# Patient Record
Sex: FEMALE | Race: WHITE | NOT HISPANIC OR LATINO | Employment: FULL TIME | URBAN - METROPOLITAN AREA
[De-identification: names, ages, dates, MRNs, and addresses within clinical notes are randomized per-mention and may not be internally consistent; named-entity substitution may affect disease eponyms.]

---

## 2017-11-30 ENCOUNTER — APPOINTMENT (OUTPATIENT)
Dept: WOMENS IMAGING | Facility: HOSPITAL | Age: 47
End: 2017-11-30

## 2018-12-28 ENCOUNTER — APPOINTMENT (OUTPATIENT)
Dept: WOMENS IMAGING | Facility: HOSPITAL | Age: 48
End: 2018-12-28

## 2018-12-28 PROCEDURE — 77063 BREAST TOMOSYNTHESIS BI: CPT | Performed by: RADIOLOGY

## 2018-12-28 PROCEDURE — 77067 SCR MAMMO BI INCL CAD: CPT | Performed by: RADIOLOGY

## 2020-01-07 ENCOUNTER — APPOINTMENT (OUTPATIENT)
Dept: WOMENS IMAGING | Facility: HOSPITAL | Age: 50
End: 2020-01-07

## 2020-01-07 PROCEDURE — 77063 BREAST TOMOSYNTHESIS BI: CPT | Performed by: RADIOLOGY

## 2020-01-07 PROCEDURE — 77067 SCR MAMMO BI INCL CAD: CPT | Performed by: RADIOLOGY

## 2020-01-28 ENCOUNTER — APPOINTMENT (OUTPATIENT)
Dept: WOMENS IMAGING | Facility: HOSPITAL | Age: 50
End: 2020-01-28

## 2020-01-28 PROCEDURE — 76641 ULTRASOUND BREAST COMPLETE: CPT | Performed by: RADIOLOGY

## 2020-01-28 PROCEDURE — 77065 DX MAMMO INCL CAD UNI: CPT | Performed by: RADIOLOGY

## 2020-01-28 PROCEDURE — 77061 BREAST TOMOSYNTHESIS UNI: CPT | Performed by: RADIOLOGY

## 2020-01-28 PROCEDURE — MDREVSPNEW: Performed by: RADIOLOGY

## 2020-01-28 PROCEDURE — G0279 TOMOSYNTHESIS, MAMMO: HCPCS | Performed by: RADIOLOGY

## 2020-01-31 ENCOUNTER — APPOINTMENT (OUTPATIENT)
Dept: WOMENS IMAGING | Facility: HOSPITAL | Age: 50
End: 2020-01-31

## 2020-01-31 PROCEDURE — 19084 BX BREAST ADD LESION US IMAG: CPT | Performed by: RADIOLOGY

## 2020-01-31 PROCEDURE — 19083 BX BREAST 1ST LESION US IMAG: CPT | Performed by: RADIOLOGY

## 2021-02-16 ENCOUNTER — APPOINTMENT (OUTPATIENT)
Dept: WOMENS IMAGING | Facility: HOSPITAL | Age: 51
End: 2021-02-16

## 2021-02-16 PROCEDURE — 77067 SCR MAMMO BI INCL CAD: CPT | Performed by: RADIOLOGY

## 2021-02-16 PROCEDURE — 77063 BREAST TOMOSYNTHESIS BI: CPT | Performed by: RADIOLOGY

## 2022-05-17 ENCOUNTER — APPOINTMENT (OUTPATIENT)
Dept: WOMENS IMAGING | Facility: HOSPITAL | Age: 52
End: 2022-05-17

## 2022-05-17 PROCEDURE — 77063 BREAST TOMOSYNTHESIS BI: CPT | Performed by: RADIOLOGY

## 2022-05-17 PROCEDURE — 77067 SCR MAMMO BI INCL CAD: CPT | Performed by: RADIOLOGY

## 2023-12-17 ENCOUNTER — TELEPHONE (OUTPATIENT)
Dept: URGENT CARE | Facility: CLINIC | Age: 53
End: 2023-12-17
Payer: COMMERCIAL

## 2023-12-17 DIAGNOSIS — J01.01 ACUTE RECURRENT MAXILLARY SINUSITIS: Primary | ICD-10-CM

## 2023-12-17 RX ORDER — FLUCONAZOLE 150 MG/1
150 TABLET ORAL ONCE
Qty: 1 TABLET | Refills: 0 | Status: SHIPPED | OUTPATIENT
Start: 2023-12-17 | End: 2023-12-17

## 2025-04-02 ENCOUNTER — TRANSCRIBE ORDERS (OUTPATIENT)
Dept: PHYSICAL THERAPY | Facility: HOSPITAL | Age: 55
End: 2025-04-02
Payer: COMMERCIAL

## 2025-04-02 DIAGNOSIS — M54.30 SCIATICA, UNSPECIFIED LATERALITY: Primary | ICD-10-CM

## 2025-04-08 ENCOUNTER — HOSPITAL ENCOUNTER (OUTPATIENT)
Dept: PHYSICAL THERAPY | Facility: HOSPITAL | Age: 55
Setting detail: THERAPIES SERIES
Discharge: HOME | End: 2025-04-08
Payer: COMMERCIAL

## 2025-04-08 DIAGNOSIS — M25.552 PAIN OF LEFT HIP: Primary | ICD-10-CM

## 2025-04-08 DIAGNOSIS — M54.42 MIDLINE LOW BACK PAIN WITH LEFT-SIDED SCIATICA, UNSPECIFIED CHRONICITY: ICD-10-CM

## 2025-04-08 PROCEDURE — 97161 PT EVAL LOW COMPLEX 20 MIN: CPT

## 2025-04-08 PROCEDURE — 97110 THERAPEUTIC EXERCISES: CPT

## 2025-04-09 NOTE — THERAPY EVALUATION
Outpatient Physical Therapy Ortho Initial Evaluation  [facility]     Patient Name: Kymberly Arzola  : 1970  MRN: [id number]  Today's Date: 2025      Visit Date: 2025    There is no problem list on file for this patient.       No past medical history on file.     Past Surgical History:   Procedure Laterality Date    HYSTERECTOMY         Visit Dx:     ICD-10-CM ICD-9-CM   1. Pain of left hip  M25.552 719.45   2. Midline low back pain with left-sided sciatica, unspecified chronicity  M54.42 724.3          Patient History       Row Name 25 1800 25 1400          History    Chief Complaint Difficulty with daily activities;Pain  -MO --     Type of Pain Back pain;Hip pain  -MO --     Brief Description of Current Complaint 53 y/o female pt reports to PT w/o ongoing low back pain and L sciatica. Full hx includes an old back injury that reinjured it in , had immediate relief with chiropractic care. In , she did back handspring on [employer], had small pulled / torn hamstring/ glute, healed in several weeks. Last year, she was training for the mini and had started walking with dumbbells and felt the immediate flare up. Was unable to run the mini, has had ongoing consistent pain since then. Has not gotten any relief with any interventions since then. Has gone on anti-inflammatory diet, lost weight however had no relief, tried chiropractor and stretches. Takes muscle relaxors intermittently, has tried cryotherapy Very aggravated with prolonged sitting. Does Use back brace intermittently. Typically walks 3 miles per day, works at home on computer however has standing desk that is more comfortable. Pain is very mild in the low back, greatest irritation is sciatic pain down the L side. Radicular symptom stops mid thigh, never down to knee or past. Does have intermittent numbness down into the entire leg. Denies any N/T into saddle area or B&B changes. Sleeping effected some, has started to  have some pain with side sleeping on her hips. Only alleviating factors are laying supine with pillow under knees and standing after being seated for a long time.  -MO --  -MO        Pain     Pain Location Back;Hip  -MO --     Pain at Present 8  -MO --     Pain at Best 2  -MO --     Pain at Worst 9  -MO --        Fall Risk Assessment    Any falls in the past year: No  -MO --        Services    Prior Rehab/Home Health Experiences No  -MO --        Daily Activities    Primary Language English  -MO --     Are you able to read Yes  -MO --     Are you able to write Yes  -MO --     How does patient learn best? Listening;Reading;Demonstration  -MO --     Does patient have problems with the following? None  -MO --     Barriers to learning None  -MO --     Pt Participated in POC and Goals Yes  -MO --        Safety    Are you being hurt, hit, or frightened by anyone at home or in your life? No  -MO --     Are you being neglected by a caregiver No  -MO --     Have you had any of the following issues with N/A  -MO --               User Key  (r) = Recorded By, (t) = Taken By, (c) = Cosigned By      Initials Name Provider Type    Rashida Adames, PT Physical Therapist                     PT Ortho       Row Name 04/08/25 1400       Quarter Clearing    Quarter Clearing Lower Quarter Clearing  -MO       Neural Tension Signs- Lower Quarter Clearing    SLR Left:;Positive  -MO       Sensory Screen for Light Touch- Lower Quarter Clearing    L1 (inguinal area) Intact  -MO    L2 (anterior mid thigh) Intact  -MO    L3 (distal anterior thigh) Intact  -MO    L4 (medial lower leg/foot) Intact  -MO    L5 (lateral lower leg/great toe) Intact  -MO    S1 (bottom of foot) Intact  -MO       Myotomal Screen- Lower Quarter Clearing    Hip flexion (L2) Bilateral:;4+ (Good +)  -MO    Knee extension (L3) Bilateral:;4+ (Good +)  -MO    Ankle DF (L4) Bilateral:;4+ (Good +)  -MO    Ankle PF (S1) Bilateral:;4+ (Good +)  -MO    Knee flexion (S2)  Bilateral:;4+ (Good +)  -MO       Lumbar ROM Screen- Lower Quarter Clearing    Lumbar Flexion Impaired  to feet however minimal flexion of lumbar spine noted, compensation through thoracic and hips  -MO    Lumbar Extension Normal  -MO    Lumbar Lateral Flexion Normal  -MO    Lumbar Rotation Normal  -MO       SI/Hip Screen- Lower Quarter Clearing    ASIS compression Bilateral:;Negative  -MO    ASIS distraction Bilateral:;Negative  -MO       Special Tests/Palpation    Special Tests/Palpation Lumbar/SI  -MO       Lumbosacral Accessory Motions    Lumbosacral Accessory Motions Tested? Yes  -MO       Lumbosacral Palpation    Lumbosacral Palpation? Yes  -MO    SI Bilateral:;Tender  -MO    Piriformis Bilateral:;Guarded/taut;Tender  -MO    Gluteus Torey Left:;Guarded/taut;Tender  -MO    Greater Tuberosity Bilateral:;Tender  -MO       MMT (Manual Muscle Testing)    Rt Lower Ext Rt Hip ABduction;Rt Hip Extension  -MO    Lt Lower Ext Lt Hip ABduction;Lt Hip Extension  -MO       MMT Right Lower Ext    Rt Hip Extension MMT, Gross Movement (4/5) good  -MO    Rt Hip ABduction MMT, Gross Movement (4/5) good  -MO       MMT Left Lower Ext    Lt Hip Extension MMT, Gross Movement (4/5) good  -MO    Lt Hip ABduction MMT, Gross Movement (4/5) good  -MO              User Key  (r) = Recorded By, (t) = Taken By, (c) = Cosigned By      Initials Name Provider Type    Rashida Adames, PT Physical Therapist                                Therapy Education  Education Details: Educated on PT role and POC; discussed expectations/timeframe for healing. Discussed modifying activity at this time, holding on daily 3 mile walks and completing only 3x per week at this time to ensure appropriate rest, did discuss attempting RCB or unweighted cardio activity vs walking. She is agreeable  Given: HEP, Symptoms/condition management  Program: New  How Provided: Verbal, Demonstration, Written  Provided to: Patient  Level of Understanding: Teach back education  performed, Verbalized, Demonstrated      PT OP Goals       Row Name 04/08/25 1800          PT Short Term Goals    STG Date to Achieve 05/08/25  -MO     STG 1 Pt. will be independent with initial HEP to improve self-management of condition.  -MO     STG 1 Progress New  -MO     STG 2 Pt. will demonstrate proper body mechanics with forward bending/lifting activities to preserve lumbar spine with functional activities.  -MO     STG 2 Progress New  -MO     STG 3 Pt will improve sitting tolerance to >30 mins prior to onset of pain for improved tolerance to car rides.  -MO     STG 3 Progress New  -MO        Long Term Goals    LTG Date to Achieve 06/07/25  -MO     LTG 1 Pt. will be independent with advanced HEP to improve long term independence with self management of condition.  -MO     LTG 1 Progress New  -MO     LTG 2 Pt. will score </= 15/50 on Modified Oswestry to indicate improved perception of disability.  -MO     LTG 2 Progress New  -MO     LTG 3 Pt will demo improved gross BL hip strength to >/= 4+/5.  -MO     LTG 3 Progress New  -MO     LTG 4 Pt will report centralization of L radicular symptoms, denying any neural symptoms past gluteal fold for long term management of condition.  -MO     LTG 4 Progress New  -MO     LTG 5 Pt will report ability to return to daily walking program with pain </= 1/10 for return to all recreational activities.  -MO     LTG 5 Progress New  -MO        Time Calculation    PT Goal Re-Cert Due Date 07/07/25  -MO               User Key  (r) = Recorded By, (t) = Taken By, (c) = Cosigned By      Initials Name Provider Type    Rashida Adames, PT Physical Therapist                     PT Assessment/Plan       Row Name 04/08/25 1800          PT Assessment    Functional Limitations Performance in self-care ADL;Performance in work activities;Performance in sport activities;Performance in leisure activities;Limitations in community activities  -MO     Impairments Gait;Pain;Range of motion;Muscle  strength;Impaired flexibility  -MO     Assessment Comments Kymberly Arzola is a 54 y.o. female referred to physical therapy for low back pain with L side sciatica with radicular symptoms. She presents with a stable clinical presentation, along with no remarkable comorbidities and personal factors of highly active lifestyle that may impact her progress in the plan of care. Pt presents today with moderate pain irritability as pt elects to stand for duration of eval secondary to impacted sitting tolerance. She has very mild strength deficits however has notable tightness throughout BL hip external rotators and hip flexors, as well as is TTP with taught bands present into BL glute tissue. She is (+) for slump and SLR on L side, (-) on R. (-) for all hip pathology workup. Her signs and symptoms are consistent with referring diagnosis. The previous impairments limit her ability to complete all daily ADLs without pain, navigate full workout without significant discomfort, and participate in all recreational activities. Pt will benefit from skilled PT to address the previous impairments and return to PLOF.  -MO     Please refer to paper survey for additional self-reported information No  -MO     Rehab Potential Good  -MO     Patient/caregiver participated in establishment of treatment plan and goals Yes  -MO     Patient would benefit from skilled therapy intervention Yes  -MO        PT Plan    PT Frequency 1x/week  -MO     Predicted Duration of Therapy Intervention (PT) 10 visits  -MO     Planned CPT's? PT EVAL LOW COMPLEXITY: 54165;PT RE-EVAL: 00737;PT THER PROC EA 15 MIN: 94457;PT THER ACT EA 15 MIN: 05236;PT MANUAL THERAPY EA 15 MIN: 55337;PT NEUROMUSC RE-EDUCATION EA 15 MIN: 82434;PT GAIT TRAINING EA 15 MIN: 21525;PT SELF CARE/HOME MGMT/TRAIN EA 15: 65691  -MO     Physical Therapy Interventions (Optional Details) dry needling  -MO     PT Plan Comments nustep warmup if tolerable position. Emphasize stretching initially  with potential manual. standing HS stretch, seated SB roll out, increase hold duration on seated figure 4 stretch, supine 90/90 glide, pigeon pose stretch(for ER), frog pose, cat/cow into marla pose. Could initiate manual to L glute tissue. figure 4 bridge, s/l hip abd, lateral walk, monster walk. May benefit from dry needling in the future  -MO               User Key  (r) = Recorded By, (t) = Taken By, (c) = Cosigned By      Initials Name Provider Type    Rashida Adames, PT Physical Therapist                       OP Exercises       Row Name 04/08/25 1800             Total Minutes    73121 - PT Therapeutic Exercise Minutes 8  -MO         Exercise 2    Exercise Name 2 supine 90/90 sciatic nerve glide  -MO      Cueing 2 Verbal;Demo  -MO      Sets 2 1B  -MO      Reps 2 10  -MO         Exercise 3    Exercise Name 3 supine/ seated piriformis stretch  -MO      Sets 3 1e, L  -MO      Reps 3 20s  -MO                User Key  (r) = Recorded By, (t) = Taken By, (c) = Cosigned By      Initials Name Provider Type    Rashida Adames, PT Physical Therapist                                  Outcome Measure Options: Modified Oswestry  Modified Oswestry  Modified Oswestry Score/Comments: 29/50      Time Calculation:     Start Time: 1410  Stop Time: 1448  Time Calculation (min): 38 min  Timed Charges  04145 - PT Therapeutic Exercise Minutes: 8  Untimed Charges  PT Eval/Re-eval Minutes: 30  Total Minutes  Timed Charges Total Minutes: 8  Untimed Charges Total Minutes: 30   Total Minutes: 38     Therapy Charges for Today       Code Description Service Date Service Provider Modifiers Qty    93994132226 HC PT THER PROC EA 15 MIN 4/8/2025 Rashida Amaya, PT GP 1    24280985705 HC PT EVAL LOW COMPLEXITY 2 4/8/2025 Rashida Amaya, PT GP 1            PT G-Codes  Outcome Measure Options: Modified Oswestry  Modified Oswestry Score/Comments: 29/50         Rashida Amaya PT  4/9/2025

## 2025-04-16 ENCOUNTER — HOSPITAL ENCOUNTER (OUTPATIENT)
Dept: PHYSICAL THERAPY | Facility: HOSPITAL | Age: 55
Setting detail: THERAPIES SERIES
Discharge: HOME | End: 2025-04-16
Payer: COMMERCIAL

## 2025-04-16 DIAGNOSIS — M25.552 PAIN OF LEFT HIP: Primary | ICD-10-CM

## 2025-04-16 DIAGNOSIS — M54.42 MIDLINE LOW BACK PAIN WITH LEFT-SIDED SCIATICA, UNSPECIFIED CHRONICITY: ICD-10-CM

## 2025-04-16 PROCEDURE — 97140 MANUAL THERAPY 1/> REGIONS: CPT | Performed by: PHYSICAL THERAPIST

## 2025-04-16 PROCEDURE — 97110 THERAPEUTIC EXERCISES: CPT | Performed by: PHYSICAL THERAPIST

## 2025-04-16 NOTE — THERAPY TREATMENT NOTE
Outpatient Physical Therapy Ortho Treatment Note  [facility]     Patient Name: Kymberly Arzola  : 1970  MRN: [id number]  Today's Date: 2025      Visit Date: 2025    Visit Dx:    ICD-10-CM ICD-9-CM   1. Pain of left hip  M25.552 719.45   2. Midline low back pain with left-sided sciatica, unspecified chronicity  M54.42 724.3       There is no problem list on file for this patient.       No past medical history on file.     Past Surgical History:   Procedure Laterality Date    HYSTERECTOMY          PT Ortho       Row Name 25 1400       Neural Tension Signs- Lower Quarter Clearing    SLR Left:;Negative  performed after manual techniques  -GJ       Myotomal Screen- Lower Quarter Clearing    Ankle DF (L4) Left:;5 (Normal)  -              User Key  (r) = Recorded By, (t) = Taken By, (c) = Cosigned By      Initials Name Provider Type    Raghav Martinez, PT Physical Therapist                                 PT Assessment/Plan       Row Name 25 4173          PT Assessment    Assessment Comments Ms. Arzola returns to the clinic for her first follow up PT session for her LBP/RLE pain. She reports minimal change in her condition. She reports pain along the L posterior/lateral thigh to the knee, not below. Aggravating activities include sitting and sit to stand transition. She demosntrates (+) L posterior/lateral hip girdle taut bands/trigger points.  Performes deep tissue massage/trigger point release to L posterior/lateral hip girdle tissue. Educated in self trigger point release with teniss ball at wall. We also reviewed risks and benefits of DDN, which she expersses interest in trialing at future sessions.  She demosntrates (-) L SLR. Intiated gentle hip girdle/core strengtrhening activities and flexibililty activities. Updated and issued HEP. Ms. Arzola continues to be a good candidate for skilled physical therpay.  -        PT Plan    PT Plan Comments assess response to previous  session and STM/trigger point releease to L posterior/lateral hip girdle tissue. Consider DDN to same tissue vs. repeat manual STM.  consider SL clam, STS, ? cat/camel, lateral walk  -GJ               User Key  (r) = Recorded By, (t) = Taken By, (c) = Cosigned By      Initials Name Provider Type    Raghav Martinez, PT Physical Therapist                       OP Exercises       Row Name 04/16/25 1400 04/16/25 1358          Subjective    Subjective Comments no different. Siting is bad, so is sit to stand transition. No pain below the L knee  -GJ --        Subjective Pain    Pre-Treatment Pain Level 6  -GJ --        Total Minutes    88349 - PT Therapeutic Exercise Minutes -- 25  -GJ     70608 - PT Manual Therapy Minutes -- 15  -GJ        Exercise 1    Exercise Name 1 nutep  -GJ --     Time 1 5 min  -GJ --     Additional Comments UE/LE  -GJ --        Exercise 2    Exercise Name 2 supine 90/90 sciatic nerve glide  -GJ --     Cueing 2 Verbal;Demo  -GJ --     Sets 2 2  -GJ --     Reps 2 10  -GJ --     Time 2 with DF  -GJ --        Exercise 3    Exercise Name 3 supine pirirformis stretch  -GJ --     Cueing 3 Verbal;Demo  -GJ --     Reps 3 3  -GJ --     Time 3 20s  -GJ --        Exercise 4    Exercise Name 4 PPT  -GJ --     Cueing 4 Verbal;Demo  -GJ --     Reps 4 15  -GJ --     Time 4 5s  -GJ --        Exercise 5    Exercise Name 5 HL add abd  -GJ --     Cueing 5 Verbal;Demo  -GJ --     Reps 5 15  -GJ --     Time 5 GTB 3 s  -GJ --        Exercise 6    Exercise Name 6 HL hip add  -GJ --     Cueing 6 Verbal;Demo  -GJ --     Reps 6 15  -GJ --     Time 6 3s ball  -GJ --               User Key  (r) = Recorded By, (t) = Taken By, (c) = Cosigned By      Initials Name Provider Type    Raghav Martinez, PT Physical Therapist                             Manual Rx (Last 36 Hours)       Manual Treatments       Row Name 04/16/25 1358 04/16/25 1300          Total Minutes    94792 - PT Manual Therapy Minutes 15  -GJ --        Manual  Rx 1    Manual Rx 1 Location -- STM/trigger point release to L posterior/lateral hip girdle tissue, pt in R SL with pillow between knees  -GJ               User Key  (r) = Recorded By, (t) = Taken By, (c) = Cosigned By      Initials Name Provider Type    Raghav Martinez, PT Physical Therapist                     PT OP Goals       Row Name 04/16/25 1300          PT Short Term Goals    STG Date to Achieve 05/08/25  -GJ     STG 1 Pt. will be independent with initial HEP to improve self-management of condition.  -GJ     STG 1 Progress Ongoing  -GJ     STG 1 Progress Comments reviewed/initiated  -GJ     STG 2 Pt. will demonstrate proper body mechanics with forward bending/lifting activities to preserve lumbar spine with functional activities.  -GJ     STG 2 Progress Ongoing  -GJ     STG 3 Pt will improve sitting tolerance to >30 mins prior to onset of pain for improved tolerance to car rides.  -GJ     STG 3 Progress Ongoing  -GJ        Long Term Goals    LTG Date to Achieve 06/07/25  -GJ     LTG 1 Pt. will be independent with advanced HEP to improve long term independence with self management of condition.  -GJ     LTG 1 Progress Ongoing  -GJ     LTG 2 Pt. will score </= 15/50 on Modified Oswestry to indicate improved perception of disability.  -GJ     LTG 2 Progress Ongoing  -GJ     LTG 3 Pt will demo improved gross BL hip strength to >/= 4+/5.  -GJ     LTG 3 Progress Ongoing  -GJ     LTG 4 Pt will report centralization of L radicular symptoms, denying any neural symptoms past gluteal fold for long term management of condition.  -GJ     LTG 4 Progress Ongoing  -GJ     LTG 5 Pt will report ability to return to daily walking program with pain </= 1/10 for return to all recreational activities.  -GJ     LTG 5 Progress Ongoing  -GJ               User Key  (r) = Recorded By, (t) = Taken By, (c) = Cosigned By      Initials Name Provider Type    Raghav Martinez, PT Physical Therapist                    Therapy  Education  Education Details: [id number], reviewed activity modifications, discussed risks/benefits of DDN, educated in use of tennis ball for self trigger point release for posterior/lateral hip girdle tissue. encouraged pt to consider using lumbar roll to support lumbar lordosis while seated.  Discussed ergonomics/postural awareness  Given: HEP, Symptoms/condition management, Pain management, Posture/body mechanics, Fall prevention and home safety, Mobility training  Program: Reinforced, New, Progressed, Modified  How Provided: Verbal, Demonstration, Written  Provided to: Patient  Level of Understanding: Teach back education performed, Verbalized, Demonstrated              Time Calculation:   Start Time: 1400  Stop Time: 1445  Time Calculation (min): 45 min  Timed Charges  15406 - PT Therapeutic Exercise Minutes: 25  34589 - PT Manual Therapy Minutes: 15  Total Minutes  Timed Charges Total Minutes: 40   Total Minutes: 40  Therapy Charges for Today       Code Description Service Date Service Provider Modifiers Qty    70233072071 HC PT THER PROC EA 15 MIN 4/16/2025 Raghav Singh, PT GP 2    30257173667 HC PT MANUAL THERAPY EA 15 MIN 4/16/2025 Raghav Singh, PT GP 1                      Raghav Singh, PT  4/16/2025

## 2025-04-24 ENCOUNTER — HOSPITAL ENCOUNTER (OUTPATIENT)
Dept: PHYSICAL THERAPY | Facility: HOSPITAL | Age: 55
Setting detail: THERAPIES SERIES
Discharge: HOME | End: 2025-04-24
Payer: COMMERCIAL

## 2025-04-24 DIAGNOSIS — M25.552 PAIN OF LEFT HIP: Primary | ICD-10-CM

## 2025-04-24 DIAGNOSIS — M54.42 MIDLINE LOW BACK PAIN WITH LEFT-SIDED SCIATICA, UNSPECIFIED CHRONICITY: ICD-10-CM

## 2025-04-24 PROCEDURE — 97110 THERAPEUTIC EXERCISES: CPT

## 2025-04-24 PROCEDURE — 97140 MANUAL THERAPY 1/> REGIONS: CPT

## 2025-04-24 NOTE — THERAPY TREATMENT NOTE
Outpatient Physical Therapy Ortho Treatment Note  [facility]     Patient Name: Kymberly Arzola  : 1970  MRN: [id number]  Today's Date: 2025      Visit Date: 2025    Visit Dx:    ICD-10-CM ICD-9-CM   1. Pain of left hip  M25.552 719.45   2. Midline low back pain with left-sided sciatica, unspecified chronicity  M54.42 724.3       There is no problem list on file for this patient.       No past medical history on file.     Past Surgical History:   Procedure Laterality Date    HYSTERECTOMY                          PT Assessment/Plan       Row Name 25 0900          PT Assessment    Assessment Comments Kymberly returns today reporting near full resolution in pain following last session. The only onset is intermittently when standing, will be very brief, has not had any radicular symptoms down glute or into thigh. Remains (-) on L SLR test this date. Progressed hip girdle strengthening with progression to s/l clamshell and addition of figure 4 bridge, resisted lateral walk, and monster walk, as well as added cat/ cow and marla pose for pelvic mobility with great tolerance. Continued with manual techniques, does have reduced tension throughout posterior lateral L hip girdle however continues to have taught bands that do elicit mild spasm. Included foam rolling techniques to ITB, tender to mild to moderate pressure. Updated HEP, discussed appropriate frequency, continued to emphasize activity modifications, and discussed reducing self release with tennis ball to every other day if having increased soreness. She verbalizes understanding to all, will trial 2 week break between sessions and assess response.  -MO        PT Plan    PT Plan Comments tolerance to last session? How was updated HEP? DDN if indicated? Continue to progress hip girdle strength with inclusion of core. dead bug hold-- add TrA to bridges and clamshells with emphasis. Progres SL stability- potentially SL squat tap to elevated mat  table. Additionally she does report poor balance- consider adding 1-2 balance exercises- potentially tandem walk with head turns, tandem stance with head turns  -MO               User Key  (r) = Recorded By, (t) = Taken By, (c) = Cosigned By      Initials Name Provider Type    Rashida Adames, PT Physical Therapist                       OP Exercises       Row Name 04/24/25 0900             Subjective    Subjective Comments I feel great. I have barely noticed the pain  -MO         Subjective Pain    Able to rate subjective pain? yes  -MO      Pre-Treatment Pain Level 0  -MO      Post-Treatment Pain Level 0  -MO         Total Minutes    71166 - PT Therapeutic Exercise Minutes 30  -MO      40182 - PT Manual Therapy Minutes 10  -MO         Exercise 1    Exercise Name 1 nutep  -MO      Time 1 5 min  -MO         Exercise 2    Exercise Name 2 supine 90/90 sciatic nerve glide  -MO      Cueing 2 Verbal  -MO      Sets 2 2  -MO      Reps 2 10  -MO      Time 2 with DF  -MO         Exercise 3    Exercise Name 3 supine pirirformis stretch  -MO      Cueing 3 Verbal  -MO      Reps 3 3  -MO      Time 3 20s  -MO         Exercise 5    Exercise Name 5 s/l clamshell  -MO      Cueing 5 Verbal;Tactile  -MO      Sets 5 2B  -MO      Reps 5 12  -MO      Time 5 GTB  -MO         Exercise 7    Exercise Name 7 figure 4 bridge  -MO      Cueing 7 Verbal  -MO      Sets 7 2B  -MO      Reps 7 12  -MO         Exercise 8    Exercise Name 8 cat cow to marla pose  -MO      Cueing 8 Verbal;Demo  -MO      Sets 8 x5 cat/cow only  -MO      Reps 8 x5 cat/cow into marla pose  -MO         Exercise 9    Exercise Name 9 lateral walk  -MO      Cueing 9 Verbal;Demo  -MO      Reps 9 3 laps  -MO      Time 9 GTB at ankles  -MO         Exercise 10    Exercise Name 10 monster walak  -MO      Cueing 10 Verbal;Demo  -MO      Reps 10 3 lap  -MO      Time 10 GTB at ankles  -MO      Additional Comments cueing for unlocked knees  -MO                User Key  (r) = Recorded  By, (t) = Taken By, (c) = Cosigned By      Initials Name Provider Type    Rashida Adames PT Physical Therapist                             Manual Rx (Last 36 Hours)       Manual Treatments       Row Name 04/24/25 0900             Total Minutes    53497 - PT Manual Therapy Minutes 10  -MO         Manual Rx 1    Manual Rx 1 Location STM/trigger point release to L posterior/lateral hip girdle tissue, pt in R SL with pillow between knees  -MO      Manual Rx 1 Type foam roll to L ITB  -MO                User Key  (r) = Recorded By, (t) = Taken By, (c) = Cosigned By      Initials Name Provider Type    Rashida Adames PT Physical Therapist                                       Time Calculation:   Start Time: 0905  Stop Time: 0945  Time Calculation (min): 40 min  Timed Charges  96923 - PT Therapeutic Exercise Minutes: 30  49395 - PT Manual Therapy Minutes: 10  Total Minutes  Timed Charges Total Minutes: 40   Total Minutes: 40  Therapy Charges for Today       Code Description Service Date Service Provider Modifiers Qty    16498565833 HC PT THER PROC EA 15 MIN 4/24/2025 Rashida Amaya PT GP 2    26488506698 HC PT MANUAL THERAPY EA 15 MIN 4/24/2025 Rashida Amaya PT GP 1                      Rashida Amaya PT  4/24/2025

## 2025-05-06 ENCOUNTER — TRANSCRIBE ORDERS (OUTPATIENT)
Dept: PHYSICAL THERAPY | Facility: HOSPITAL | Age: 55
End: 2025-05-06
Payer: COMMERCIAL

## 2025-05-06 ENCOUNTER — HOSPITAL ENCOUNTER (OUTPATIENT)
Dept: PHYSICAL THERAPY | Facility: HOSPITAL | Age: 55
Setting detail: THERAPIES SERIES
Discharge: HOME | End: 2025-05-06

## 2025-05-06 DIAGNOSIS — M25.552 PAIN OF LEFT HIP: Primary | ICD-10-CM

## 2025-05-06 DIAGNOSIS — M54.42 MIDLINE LOW BACK PAIN WITH LEFT-SIDED SCIATICA, UNSPECIFIED CHRONICITY: ICD-10-CM

## 2025-05-06 DIAGNOSIS — M54.30 SCIATICA, UNSPECIFIED LATERALITY: Primary | ICD-10-CM

## 2025-05-06 NOTE — THERAPY TREATMENT NOTE
Outpatient Physical Therapy Ortho Treatment Note  [facility]     Patient Name: Kymberly Arzola  : 1970  MRN: [id number]  Today's Date: 2025      Visit Date: 2025    Visit Dx:    ICD-10-CM ICD-9-CM   1. Pain of left hip  M25.552 719.45   2. Midline low back pain with left-sided sciatica, unspecified chronicity  M54.42 724.3       There is no problem list on file for this patient.       No past medical history on file.     Past Surgical History:   Procedure Laterality Date    HYSTERECTOMY                          PT Assessment/Plan       Row Name 25 1414          PT Assessment    Assessment Comments Ms. Arzola returns to the clinic for LBP/L posterior/lateral hip girdle pain. She reports overal improvement in her condition. She does demosntrate (+) taut bands of tissue in her L posterior/lateral hip girdle tissue, and has repsoned well to manual STM/trigger point release in the past. Following discussion re: inidications for and risks/benefits of DDN, proceeded with DDN to the L posterior/laterlal hip girdle. She demosntrated multiple moderate LTR's. No immediate adverse response.  Ms. Arzola continues to be a good candidate for skilled physical therapy.  -GJ        PT Plan    PT Plan Comments assess response to DDN of L posterior/lateral hip girdle tissue.  DDN PRN. otherwise, Continue to progress hip girdle strength with inclusion of core. dead bug hold-- add TrA to bridges and clamshells with emphasis. Progres SL stability- potentially SL squat tap to elevated mat table. Additionally she does report poor balance- consider adding 1-2 balance exercises- potentially tandem walk with head turns, tandem stance with head turns  -               User Key  (r) = Recorded By, (t) = Taken By, (c) = Cosigned By      Initials Name Provider Type    Raghav Martinez, PT Physical Therapist                       OP Exercises       Row Name 25 1400             Subjective    Subjective  Comments i'm feeling a lot better. I can stil feel it in my back if I push/pull things too much  -                User Key  (r) = Recorded By, (t) = Taken By, (c) = Cosigned By      Initials Name Provider Type    Raghav Martinez, PT Physical Therapist                             Manual Rx (Last 36 Hours)       Manual Treatments       Row Name 05/06/25 1300             Manual Rx 2    Manual Rx 2 Location intramuscular manual therapy  -GJ Assessed pt. for Dry Needling and intramuscular manual therapy. Discussed risks/benefits of Dry Needling with pt, including but not limited to muscle soreness, bruising, vasovagal response, pneumothorax, nerve injury. Patient signed written consent to proceed with treatment.    Patient position during treatment: R SL with pillow between knees  Muscles treated: L posterior/lateral hip girdle  Response to treatment: multiple moderate LTR's. No immediate adverse response.     palpation used before, during, and after to facilitate assessment Clean needle technique observed at all times, precautions for lung fields, neurovascular structures observed.    DDN performed by Raghav VAZQUEZ. Francisco VINSON, PT, DPT      Manual Rx 2 Type L posterior/lateral hip girdle  -                User Key  (r) = Recorded By, (t) = Taken By, (c) = Cosigned By      Initials Name Provider Type    Raghav Martinez, PT Physical Therapist                     PT OP Goals       Row Name 05/06/25 1300          PT Short Term Goals    STG Date to Achieve 05/08/25  -GJ     STG 1 Pt. will be independent with initial HEP to improve self-management of condition.  -GJ     STG 1 Progress Met  -GJ     STG 2 Pt. will demonstrate proper body mechanics with forward bending/lifting activities to preserve lumbar spine with functional activities.  -GJ     STG 2 Progress Met  -GJ     STG 3 Pt will improve sitting tolerance to >30 mins prior to onset of pain for improved tolerance to car rides.  -GJ     STG 3 Progress Ongoing  -GJ         Long Term Goals    LTG Date to Achieve 06/07/25  -GJ     LTG 1 Pt. will be independent with advanced HEP to improve long term independence with self management of condition.  -GJ     LTG 1 Progress Ongoing  -GJ     LTG 2 Pt. will score </= 15/50 on Modified Oswestry to indicate improved perception of disability.  -GJ     LTG 2 Progress Ongoing  -GJ     LTG 3 Pt will demo improved gross BL hip strength to >/= 4+/5.  -GJ     LTG 3 Progress Ongoing  -GJ     LTG 4 Pt will report centralization of L radicular symptoms, denying any neural symptoms past gluteal fold for long term management of condition.  -GJ     LTG 4 Progress Ongoing  -GJ     LTG 5 Pt will report ability to return to daily walking program with pain </= 1/10 for return to all recreational activities.  -GJ     LTG 5 Progress Ongoing  -GJ               User Key  (r) = Recorded By, (t) = Taken By, (c) = Cosigned By      Initials Name Provider Type     Raghav Singh, PT Physical Therapist                    Therapy Education  Education Details: Discussed risks/benefits of DDN and it's role in therapy as an adjunct therapy and not a stand alone treatment. Discussed role of strength, postural awareness, activity modifications as integral components of the plan of care. Careful attention to detail that there is no guarantee of results with DDN. Answered questions  How Provided: Verbal  Provided to: Patient  Level of Understanding: Verbalized              Time Calculation:   Start Time: 1330  Stop Time: 1400  Time Calculation (min): 30 min  Therapy Charges for Today       Code Description Service Date Service Provider Modifiers Qty    38290446722 HC PT SELF PAY DRY NEEDLING SESSION 5/6/2025 Raghav Singh, PT  1                      Raghav Singh, PT  5/6/2025

## 2025-05-13 ENCOUNTER — HOSPITAL ENCOUNTER (OUTPATIENT)
Dept: PHYSICAL THERAPY | Facility: HOSPITAL | Age: 55
Setting detail: THERAPIES SERIES
Discharge: HOME | End: 2025-05-13
Payer: COMMERCIAL

## 2025-05-13 DIAGNOSIS — M54.42 MIDLINE LOW BACK PAIN WITH LEFT-SIDED SCIATICA, UNSPECIFIED CHRONICITY: ICD-10-CM

## 2025-05-13 DIAGNOSIS — M25.552 PAIN OF LEFT HIP: Primary | ICD-10-CM

## 2025-05-13 PROCEDURE — 97110 THERAPEUTIC EXERCISES: CPT

## 2025-05-13 PROCEDURE — 97530 THERAPEUTIC ACTIVITIES: CPT

## 2025-05-13 NOTE — THERAPY DISCHARGE NOTE
Outpatient Physical Therapy Ortho Progress Note/Discharge Summary  [facility]     Patient Name: Kymberly Arzola  : 1970  MRN: [id number]  Today's Date: 2025      Visit Date: 2025    Visit Dx:    ICD-10-CM ICD-9-CM   1. Pain of left hip  M25.552 719.45   2. Midline low back pain with left-sided sciatica, unspecified chronicity  M54.42 724.3       There is no problem list on file for this patient.       No past medical history on file.     Past Surgical History:   Procedure Laterality Date    HYSTERECTOMY                          PT Assessment/Plan       Row Name 25 1400          PT Assessment    Assessment Comments Kymberly Arzola was seen for 5 physical therapy sessions, which includes 1 dry needling session, for low back pain with L sided sciatic symptoms.  Treatment included therapeutic exercise, manual therapy, therapeutic activity, patient education with home exercise program , and dry needling. Progress to physical therapy goals was excellent. Pt met 3/3 STG and 4/5 LTG, however anticipate great improvement towards remaining goal. Kymberly returns reporting overall great resolution in pain symptoms over the last several weeks. She denies any radicular symptoms down L leg, is tolerating sitting >30 mins, and has returned to walking program every other day for ~2 miles without onset of pain. She states occasionally having very minimal onset when lifting work crate, spend additional time this session reviewing lifting mechanics with emphasis on stagger stance and keeping weight near center of body, she demos appropriately several times. Of note, she had reported worsening balance symptoms, included 2 new dynamic balance activities to address. Time spent discussing advanced HEP and appropriate progressions/modifications to make based on response over next several weeks-months. Patient verbalized understanding. She was discharged to an independent HEP and provided patient education to  self-manage condition. She expresses desire to run the mini next year, did spent time cussing return to PT for more formalized strengthening program in the future.  Additionally, briefly reviewed the role of vestibular PT in relation to patient's balance symptoms, encouraged return with referral in future if appropriate.  -MO        PT Plan    PT Plan Comments d/c  -MO               User Key  (r) = Recorded By, (t) = Taken By, (c) = Cosigned By      Initials Name Provider Type    Rashida Adames, PT Physical Therapist                         OP Exercises       Row Name 05/13/25 1400             Subjective    Subjective Comments I feel really really good  -MO         Subjective Pain    Able to rate subjective pain? yes  -MO      Pre-Treatment Pain Level 0  -MO      Post-Treatment Pain Level 0  -MO         Total Minutes    23786 - PT Therapeutic Exercise Minutes 15  -MO      74915 - PT Therapeutic Activity Minutes 23  -MO         Exercise 2    Exercise Name 2 tandem stance EC  -MO      Cueing 2 Verbal;Demo  -MO      Sets 2 1B  -MO      Reps 2 30s  -MO         Exercise 3    Exercise Name 3 tandem walk w/ head turns  -MO      Cueing 3 Verbal  -MO      Reps 3 2 laps  -MO         Exercise 4    Exercise Name 4 dead bug  -MO      Cueing 4 Verbal;Demo  -MO      Sets 4 1B  -MO      Reps 4 10  -MO         Exercise 5    Exercise Name 5 lifting mechanics  -MO      Cueing 5 Verbal;Demo  -MO      Time 5 --  -MO      Additional Comments 20# crate between 2 unlevel surfaces  -MO         Exercise 6    Exercise Name 6 Progress note  -MO                User Key  (r) = Recorded By, (t) = Taken By, (c) = Cosigned By      Initials Name Provider Type    Rashida Adames, PT Physical Therapist                                    PT OP Goals       Row Name 05/13/25 1400          PT Short Term Goals    STG Date to Achieve 05/08/25  -MO     STG 1 Pt. will be independent with initial HEP to improve self-management of condition.  -MO     STG 1  Progress Met  -MO     STG 2 Pt. will demonstrate proper body mechanics with forward bending/lifting activities to preserve lumbar spine with functional activities.  -MO     STG 2 Progress Met  -MO     STG 3 Pt will improve sitting tolerance to >30 mins prior to onset of pain for improved tolerance to car rides.  -MO     STG 3 Progress Met  -MO        Long Term Goals    LTG Date to Achieve 06/07/25  -MO     LTG 1 Pt. will be independent with advanced HEP to improve long term independence with self management of condition.  -MO     LTG 1 Progress Met  -MO     LTG 2 Pt. will score </= 15/50 on Modified Oswestry to indicate improved perception of disability.  -MO     LTG 2 Progress Progressing  -MO     LTG 2 Progress Comments not reassessed this date  -MO     LTG 3 Pt will demo improved gross BL hip strength to >/= 4+/5.  -MO     LTG 3 Progress Met  -MO     LTG 4 Pt will report centralization of L radicular symptoms, denying any neural symptoms past gluteal fold for long term management of condition.  -MO     LTG 4 Progress Met  -MO     LTG 5 Pt will report ability to return to daily walking program with pain </= 1/10 for return to all recreational activities.  -MO     LTG 5 Progress Met  -MO               User Key  (r) = Recorded By, (t) = Taken By, (c) = Cosigned By      Initials Name Provider Type    Rashida Adames, PT Physical Therapist                    Therapy Education  Education Details: reviewed vestibular system and 3 components of balance, role of PT in treatment of balance dysfunction.  Additionally discussed cervicogenic tightness in relation to patient's symptom and role of PT. Full review of HEP with discussion on progressions and modifications to make as needed.  Given: HEP, Symptoms/condition management  Program: New, Reinforced  How Provided: Verbal, Demonstration, Written  Provided to: Patient  Level of Understanding: Verbalized, Demonstrated              Time Calculation:   Start Time: 1408  Stop  Time: 1446  Time Calculation (min): 38 min  Timed Charges  76057 - PT Therapeutic Exercise Minutes: 15  44258 - PT Therapeutic Activity Minutes: 23  Total Minutes  Timed Charges Total Minutes: 38   Total Minutes: 38  Therapy Charges for Today       Code Description Service Date Service Provider Modifiers Qty    64828045441  PT THER PROC EA 15 MIN 5/13/2025 Rashida Amaya, PT GP 1    [id number]  PT THERAPEUTIC ACT EA 15 MIN 5/13/2025 Rashida Amaya PT GP 2                         Rashida Amaya PT  5/13/2025

## 2025-06-26 ENCOUNTER — APPOINTMENT (OUTPATIENT)
Dept: WOMENS IMAGING | Facility: HOSPITAL | Age: 55
End: 2025-06-26
Payer: COMMERCIAL

## 2025-06-26 PROCEDURE — 77061 BREAST TOMOSYNTHESIS UNI: CPT | Performed by: RADIOLOGY

## 2025-06-26 PROCEDURE — 76642 ULTRASOUND BREAST LIMITED: CPT | Performed by: RADIOLOGY

## 2025-06-26 PROCEDURE — 77065 DX MAMMO INCL CAD UNI: CPT | Performed by: RADIOLOGY

## 2025-06-26 PROCEDURE — G0279 TOMOSYNTHESIS, MAMMO: HCPCS | Performed by: RADIOLOGY

## 2025-07-10 ENCOUNTER — APPOINTMENT (OUTPATIENT)
Dept: WOMENS IMAGING | Facility: HOSPITAL | Age: 55
End: 2025-07-10
Payer: COMMERCIAL

## 2025-07-10 ENCOUNTER — LAB REQUISITION (OUTPATIENT)
Dept: LAB | Facility: HOSPITAL | Age: 55
End: 2025-07-10
Payer: COMMERCIAL

## 2025-07-10 DIAGNOSIS — N64.89 OTHER SPECIFIED DISORDERS OF BREAST: ICD-10-CM

## 2025-07-10 PROCEDURE — A4648 IMPLANTABLE TISSUE MARKER: HCPCS | Performed by: RADIOLOGY

## 2025-07-10 PROCEDURE — C1819 TISSUE LOCALIZATION-EXCISION: HCPCS | Performed by: RADIOLOGY

## 2025-07-10 PROCEDURE — 88305 TISSUE EXAM BY PATHOLOGIST: CPT | Performed by: OBSTETRICS & GYNECOLOGY

## 2025-07-10 PROCEDURE — 19081 BX BREAST 1ST LESION STRTCTC: CPT | Performed by: RADIOLOGY

## 2025-07-11 LAB
CYTO UR: NORMAL
DX PRELIMINARY: NORMAL
LAB AP CASE REPORT: NORMAL
LAB AP CLINICAL INFORMATION: NORMAL
PATH REPORT.FINAL DX SPEC: NORMAL
PATH REPORT.GROSS SPEC: NORMAL